# Patient Record
Sex: FEMALE | Race: WHITE | NOT HISPANIC OR LATINO | URBAN - METROPOLITAN AREA
[De-identification: names, ages, dates, MRNs, and addresses within clinical notes are randomized per-mention and may not be internally consistent; named-entity substitution may affect disease eponyms.]

---

## 2018-10-11 ENCOUNTER — OFFICE VISIT (OUTPATIENT)
Dept: URBAN - METROPOLITAN AREA CLINIC 45 | Facility: CLINIC | Age: 73
End: 2018-10-11
Payer: MEDICARE

## 2018-10-11 ENCOUNTER — OFFICE VISIT (OUTPATIENT)
Dept: URBAN - METROPOLITAN AREA CLINIC 29 | Facility: CLINIC | Age: 73
End: 2018-10-11
Payer: MEDICARE

## 2018-10-11 DIAGNOSIS — H40.053 OCULAR HYPERTENSION, BILATERAL: Primary | ICD-10-CM

## 2018-10-11 DIAGNOSIS — H34.8110 CENTRAL RETINAL VEIN OCCLS, RIGHT EYE, WITH MACULAR EDEMA: Primary | ICD-10-CM

## 2018-10-11 PROCEDURE — 92134 CPTRZ OPH DX IMG PST SGM RTA: CPT | Performed by: OPHTHALMOLOGY

## 2018-10-11 PROCEDURE — 99213 OFFICE O/P EST LOW 20 MIN: CPT | Performed by: OPHTHALMOLOGY

## 2018-10-11 PROCEDURE — 92014 COMPRE OPH EXAM EST PT 1/>: CPT | Performed by: OPHTHALMOLOGY

## 2018-10-11 PROCEDURE — 67028 INJECTION EYE DRUG: CPT | Performed by: OPHTHALMOLOGY

## 2018-10-11 RX ORDER — TRAVOPROST 0.04 MG/ML
0.004 % SOLUTION/ DROPS OPHTHALMIC
Qty: 1 | Refills: 11 | Status: INACTIVE
Start: 2018-10-11 | End: 2019-04-12

## 2018-10-11 ASSESSMENT — INTRAOCULAR PRESSURE
OS: 21
OD: 21
OS: 19
OD: 19
OD: 21
OD: 19
OS: 21
OS: 19

## 2018-10-11 NOTE — IMPRESSION/PLAN
Impression: Ocular hypertension, bilateral. Code: H40.053. CCT average ou Plan: Discussed diagnosis, explained and understood by patient. Discussed IOP/ONH/Glaucoma management and risks. Continue travatan qhs ou. Will continue to monitor pressure.  Recommend retinal consult today for CRVO OD.

## 2018-10-11 NOTE — IMPRESSION/PLAN
Impression: Central retinal vein occls, right eye, with macular edema: H34.8110. Plan: Onset this morning, per pt. OCT with IRF, [facility] CF today. Rec avastin injection today with f/u in 1 mth for OCT/DFE Pt currently being treated with chemo for multiple myeloma. No evidence of hyperviscosity syndrome OS at this time. Will send note to pt's oncologist (Dr. Rose Mckeon) to monitor viscosity and protein level to limit risk for affecting left eye.

## 2018-11-08 ENCOUNTER — OFFICE VISIT (OUTPATIENT)
Dept: URBAN - METROPOLITAN AREA CLINIC 45 | Facility: CLINIC | Age: 73
End: 2018-11-08
Payer: MEDICARE

## 2018-11-08 PROCEDURE — 92134 CPTRZ OPH DX IMG PST SGM RTA: CPT | Performed by: OPHTHALMOLOGY

## 2018-11-08 PROCEDURE — 92014 COMPRE OPH EXAM EST PT 1/>: CPT | Performed by: OPHTHALMOLOGY

## 2018-11-08 ASSESSMENT — INTRAOCULAR PRESSURE
OD: 14
OS: 14

## 2018-11-08 NOTE — IMPRESSION/PLAN
Impression: Central retinal vein occls, right eye, with macular edema: H34.8110. Condition: improving. Plan: S/p #1 avastin inj 10/12/18. Great response, no CME today. Will cont to observe closely. Pt prefer's to be seen in [facility], will have F/u Dr. Maeve Kebede in 1 mth or sooner prn Pt currently being treated with chemo for multiple myeloma. No evidence of hyperviscosity syndrome OS at this time.

## 2018-12-14 ENCOUNTER — OFFICE VISIT (OUTPATIENT)
Dept: URBAN - METROPOLITAN AREA CLINIC 23 | Facility: CLINIC | Age: 73
End: 2018-12-14
Payer: MEDICARE

## 2018-12-14 PROCEDURE — 99213 OFFICE O/P EST LOW 20 MIN: CPT | Performed by: OPHTHALMOLOGY

## 2018-12-14 PROCEDURE — 92134 CPTRZ OPH DX IMG PST SGM RTA: CPT | Performed by: OPHTHALMOLOGY

## 2018-12-14 ASSESSMENT — INTRAOCULAR PRESSURE
OD: 19
OS: 19

## 2018-12-14 NOTE — IMPRESSION/PLAN
Impression: Central retinal vein occls, right eye, with macular edema: H34.8110. OD. Condition: improving.
s/p AV OD #1 w/ Dr Anuja Jimenez 10/11/2018 Plan: Discussed diagnosis in detail with patient. No treatment is required at this time based on exam and OCT. Recommend observation for now. Will reassess condition in 6 wks.  OCT shows no active leakage

## 2019-01-25 ENCOUNTER — OFFICE VISIT (OUTPATIENT)
Dept: URBAN - METROPOLITAN AREA CLINIC 23 | Facility: CLINIC | Age: 74
End: 2019-01-25
Payer: MEDICARE

## 2019-01-25 PROCEDURE — 99213 OFFICE O/P EST LOW 20 MIN: CPT | Performed by: OPHTHALMOLOGY

## 2019-01-25 PROCEDURE — 92134 CPTRZ OPH DX IMG PST SGM RTA: CPT | Performed by: OPHTHALMOLOGY

## 2019-01-25 ASSESSMENT — INTRAOCULAR PRESSURE
OD: 17
OS: 14

## 2019-01-25 NOTE — IMPRESSION/PLAN
Impression: Central retinal vein occls, right eye, with macular edema: H34.8110. OD. Condition: stable
s/p AV OD #1 w/ Dr Nayla Self 10/11/2018 Plan: Discussed diagnosis in detail with patient. No treatment is required at this time based on exam and OCT. Recommend observation for now. Will reassess condition in 6 wks. OCT shows no active leakage.   Recommend pt to continue eye care and keep future appointments w/ Dr Jaun Awan

## 2019-04-12 ENCOUNTER — OFFICE VISIT (OUTPATIENT)
Dept: URBAN - METROPOLITAN AREA CLINIC 29 | Facility: CLINIC | Age: 74
End: 2019-04-12
Payer: MEDICARE

## 2019-04-12 DIAGNOSIS — H40.1112 PRIMARY OPEN-ANGLE GLAUCOMA, RIGHT EYE, MODERATE STAGE: Primary | ICD-10-CM

## 2019-04-12 DIAGNOSIS — H40.052 OCULAR HYPERTENSION OF LEFT EYE: ICD-10-CM

## 2019-04-12 PROCEDURE — 99214 OFFICE O/P EST MOD 30 MIN: CPT | Performed by: OPHTHALMOLOGY

## 2019-04-12 PROCEDURE — 92133 CPTRZD OPH DX IMG PST SGM ON: CPT | Performed by: OPHTHALMOLOGY

## 2019-04-12 RX ORDER — TRAVOPROST 0.04 MG/ML
0.004 % SOLUTION/ DROPS OPHTHALMIC
Qty: 1 | Refills: 11 | Status: INACTIVE
Start: 2019-04-12 | End: 2019-11-26

## 2019-04-12 ASSESSMENT — INTRAOCULAR PRESSURE
OS: 18
OD: 20

## 2019-04-12 NOTE — IMPRESSION/PLAN
Impression: Primary open-angle glaucoma, right eye, moderate stage: H40.1112. OD.
-IOP OD borderline -
IOP OS doing well - ONH large CD ratio OD -
ONH healthy appearance OS - Plan: Discussed diagnosis, explained and understood by patient. Discussed IOP/ONH/Glaucoma management and risks. OCT ordered, performed and reviewed. continue travatan z ou qhs. Discussed side effects of glaucoma meds. Will continue to monitor condition and symptoms.

## 2019-08-27 ENCOUNTER — OFFICE VISIT (OUTPATIENT)
Dept: URBAN - METROPOLITAN AREA CLINIC 29 | Facility: CLINIC | Age: 74
End: 2019-08-27
Payer: MEDICARE

## 2019-08-27 PROCEDURE — 92083 EXTENDED VISUAL FIELD XM: CPT | Performed by: OPHTHALMOLOGY

## 2019-08-27 PROCEDURE — 76514 ECHO EXAM OF EYE THICKNESS: CPT | Performed by: OPHTHALMOLOGY

## 2019-08-27 PROCEDURE — 99214 OFFICE O/P EST MOD 30 MIN: CPT | Performed by: OPHTHALMOLOGY

## 2019-08-27 RX ORDER — PREDNISOLONE ACETATE 10 MG/ML
1 % SUSPENSION/ DROPS OPHTHALMIC
Qty: 2.5 | Refills: 0 | Status: INACTIVE
Start: 2019-08-27 | End: 2019-08-27

## 2019-08-27 ASSESSMENT — INTRAOCULAR PRESSURE
OD: 24
OS: 21

## 2019-08-27 NOTE — IMPRESSION/PLAN
Impression: Primary open-angle glaucoma, right eye, moderate stage: H40.1112. OD.
-IOP OD elevated -
IOP OS doing well - ONH large CD ratio OD -
ONH healthy appearance OS - Plan: Discussed diagnosis, explained and understood by patient. Discussed IOP/ONH/Glaucoma management and risks. VF ordered, performed and reviewed. continue travatan z ou qhs. Recommends ALT OD to lower IOP. discussed RBA's and laser procedure. RL=2 Patient elects ALT OD. start prednisolone OD qid x 7 days after laser.

## 2019-08-29 ENCOUNTER — SURGERY (OUTPATIENT)
Dept: URBAN - METROPOLITAN AREA SURGERY 11 | Facility: SURGERY | Age: 74
End: 2019-08-29
Payer: MEDICARE

## 2019-08-29 PROCEDURE — 65855 TRABECULOPLASTY LASER SURG: CPT | Performed by: OPHTHALMOLOGY

## 2019-09-06 ENCOUNTER — POST-OPERATIVE VISIT (OUTPATIENT)
Dept: URBAN - METROPOLITAN AREA CLINIC 29 | Facility: CLINIC | Age: 74
End: 2019-09-06

## 2019-09-06 DIAGNOSIS — Z09 ENCNTR FOR F/U EXAM AFT TRTMT FOR COND OTH THAN MALIG NEOPLM: Primary | ICD-10-CM

## 2019-09-06 PROCEDURE — 99024 POSTOP FOLLOW-UP VISIT: CPT | Performed by: OPTOMETRIST

## 2019-09-06 ASSESSMENT — INTRAOCULAR PRESSURE
OD: 20
OS: 19

## 2019-10-01 ENCOUNTER — OFFICE VISIT (OUTPATIENT)
Dept: URBAN - METROPOLITAN AREA CLINIC 29 | Facility: CLINIC | Age: 74
End: 2019-10-01
Payer: MEDICARE

## 2019-10-01 DIAGNOSIS — H40.1121 PRIMARY OPEN-ANGLE GLAUCOMA, LEFT EYE, MILD STAGE: ICD-10-CM

## 2019-10-01 PROCEDURE — 99213 OFFICE O/P EST LOW 20 MIN: CPT | Performed by: OPHTHALMOLOGY

## 2019-10-01 ASSESSMENT — INTRAOCULAR PRESSURE
OD: 17
OS: 19

## 2019-10-01 NOTE — IMPRESSION/PLAN
Impression: Primary open-angle glaucoma, right eye, moderate stage: H40.1112. OD.
-IOP doing well ou - ONH large CD ratio OD -
ONH healthy appearance OS - Plan: Discussed diagnosis, explained and understood by patient. Discussed IOP/ONH/Glaucoma management and risks. continue travatan z ou qhs.
will continue to monitor condition and symptoms.

## 2021-03-01 ENCOUNTER — OFFICE VISIT (OUTPATIENT)
Dept: URBAN - METROPOLITAN AREA CLINIC 23 | Facility: CLINIC | Age: 76
End: 2021-03-01
Payer: MEDICARE

## 2021-03-01 PROCEDURE — 99213 OFFICE O/P EST LOW 20 MIN: CPT | Performed by: OPHTHALMOLOGY

## 2021-03-01 ASSESSMENT — INTRAOCULAR PRESSURE
OS: 23
OD: 20

## 2021-03-01 NOTE — IMPRESSION/PLAN
Impression: Primary open-angle glaucoma, left eye, mild stage: H40.1121. OS.
IOP elevated OS Plan: Discussed adding drops vs laser. Patient elects SLT. Recommend Trabeculoplasty (SLT) os to possibly lower IOP. Discussed RBA's of laser trabeculoplasty .  RL=2

## 2021-03-01 NOTE — IMPRESSION/PLAN
Impression: Primary open-angle glaucoma, right eye, moderate stage: H40.1112. OD.
IOP doing well ou 
ONH large CD ratio OD 
ONH healthy appearance OS Plan: Discussed diagnosis, explained and understood by patient. Discussed IOP/ONH/Glaucoma management and risks. Continue travaprost ou qhs. will continue to monitor condition and symptoms.